# Patient Record
Sex: FEMALE | Race: BLACK OR AFRICAN AMERICAN | NOT HISPANIC OR LATINO | URBAN - METROPOLITAN AREA
[De-identification: names, ages, dates, MRNs, and addresses within clinical notes are randomized per-mention and may not be internally consistent; named-entity substitution may affect disease eponyms.]

---

## 2024-07-16 DIAGNOSIS — I70.222 CRITICAL LIMB ISCHEMIA OF LEFT LOWER EXTREMITY (MULTI): Primary | ICD-10-CM

## 2024-07-16 DIAGNOSIS — L97.509 TOE ULCER (MULTI): ICD-10-CM

## 2024-08-02 ENCOUNTER — HOSPITAL ENCOUNTER (OUTPATIENT)
Dept: VASCULAR MEDICINE | Facility: HOSPITAL | Age: 74
Discharge: HOME | End: 2024-08-02
Payer: COMMERCIAL

## 2024-08-02 DIAGNOSIS — L97.509 TOE ULCER (MULTI): ICD-10-CM

## 2024-08-02 DIAGNOSIS — I73.9 PERIPHERAL VASCULAR DISEASE, UNSPECIFIED (CMS-HCC): ICD-10-CM

## 2024-08-02 DIAGNOSIS — I70.222 CRITICAL LIMB ISCHEMIA OF LEFT LOWER EXTREMITY (MULTI): ICD-10-CM

## 2024-08-02 PROCEDURE — 93922 UPR/L XTREMITY ART 2 LEVELS: CPT

## 2024-08-02 PROCEDURE — 93922 UPR/L XTREMITY ART 2 LEVELS: CPT | Performed by: INTERNAL MEDICINE

## 2024-08-06 ENCOUNTER — APPOINTMENT (OUTPATIENT)
Dept: CARDIOLOGY | Facility: CLINIC | Age: 74
End: 2024-08-06

## 2024-08-06 VITALS
DIASTOLIC BLOOD PRESSURE: 79 MMHG | HEART RATE: 72 BPM | WEIGHT: 173.94 LBS | HEIGHT: 64 IN | SYSTOLIC BLOOD PRESSURE: 210 MMHG | BODY MASS INDEX: 29.7 KG/M2

## 2024-08-06 DIAGNOSIS — I73.9 PAD (PERIPHERAL ARTERY DISEASE) (CMS-HCC): Primary | ICD-10-CM

## 2024-08-06 DIAGNOSIS — N18.6 ESRD (END STAGE RENAL DISEASE) (MULTI): ICD-10-CM

## 2024-08-06 PROCEDURE — 99204 OFFICE O/P NEW MOD 45 MIN: CPT | Performed by: INTERNAL MEDICINE

## 2024-08-06 PROCEDURE — 3008F BODY MASS INDEX DOCD: CPT | Performed by: INTERNAL MEDICINE

## 2024-08-06 PROCEDURE — 1126F AMNT PAIN NOTED NONE PRSNT: CPT | Performed by: INTERNAL MEDICINE

## 2024-08-06 RX ORDER — APIXABAN 2.5 MG/1
2.5 TABLET, FILM COATED ORAL 2 TIMES DAILY
COMMUNITY

## 2024-08-06 RX ORDER — ASPIRIN 81 MG/1
81 TABLET ORAL DAILY
Qty: 30 TABLET | Refills: 11 | Status: SHIPPED | OUTPATIENT
Start: 2024-08-06 | End: 2025-08-06

## 2024-08-06 RX ORDER — AMLODIPINE BESYLATE 10 MG/1
1 TABLET ORAL
COMMUNITY
Start: 2024-01-07

## 2024-08-06 RX ORDER — ALBUTEROL SULFATE 0.83 MG/ML
2.5 SOLUTION RESPIRATORY (INHALATION) 3 TIMES DAILY
COMMUNITY

## 2024-08-06 RX ORDER — APIXABAN 5 MG/1
1 TABLET, FILM COATED ORAL
COMMUNITY
Start: 2023-11-27

## 2024-08-06 ASSESSMENT — PAIN SCALES - GENERAL: PAINLEVEL: 0-NO PAIN

## 2024-08-06 NOTE — PATIENT INSTRUCTIONS
It was a pleasure taking care of you today and appreciate your seeing us at our [facility].     Today's plan is as follows:  Will plan for right leg angiogram on August 28th, 2024 to fix blood flow to right leg. Instructions are below  Start taking baby aspirin every day  STOP taking Eliquis 48 hours prior to the procedure (STOP DATE August 26th, 2024)    INSTRUCTIONS FOR ANGIOGRAM  We are going to schedule you for an angioplasty procedure to open the blood flow to your right leg at [facility] ([address] [facility], [address]) with Dr. Craig  The cath lab staff will call you the day before the procedure for further instructions and time of the procedure.   The phone number to reach them at is [phone] (M-F, 6:30 am -5 pm)   You will need to have blood work done prior to the procedure. Please have blood drawn at any  location today or within a week of the procedure.  Plan to stay overnight after the procedure to be discharged the next day. But there is a chance you will be discharged home the same day.   You will need to have a ride to and from the hospital.   Please bring an updated list of all your medications or all the medication on the day of the procedure for review.  Please do not eat or drink anything after midnight before the procedure.   Please HOLD METFORMIN 48 hrs before the procedure.   If you take Insulin at night, TAKE HALF a dose of INSULIN the night before procedure and NO INSULIN in the morning of the procedure.   You can take the rest of your medications as prescribed in the morning of the procedure  with a sip of water.   Please DO NOT STOP taking ASPIRIN     Please call the office with any questions at [phone], press option 1  If you need coordinating your appointments and testing you can do these at the  or by calling my office shortly after your visit.

## 2024-08-06 NOTE — PROGRESS NOTES
[facility] Note    Referring Provider: Luis Motley DO  PCP: Pablo Sanders MD  Podiatrist: Rosita Montague DPM    CC: PAD with tissue loss  Subjective   HPI:  Vanessa Chowdhury is 73 y.o. female with history of HTN, HLD, Afib (on Eliquis), DM, ESRD on HD TTS (via left arm AVF), PAD who was referred by Dr. Motley for endovascular intervention for right foot tissue loss. Right toe ulcer for 2 months, Managed by Dr. Montague. Endorses right foot rest pain, that is relieved with dangling the leg. Non-ambulatory since 2012 after her cervical spine intervention. Gets around in a motorized wheelchair. However, she is able to stand and pivot. Takes a few steps with a walker.     He underwent bilateral lower extremity angiogram on 7/5/24 with Dr. Motley that demonstrated severe stenosis of bilateral SFA/pop with 1 vessel run-off (R AT and L PT).     Past Vascular History:  7/5/2024: B/L lower extremity angiogram (Dr. Motley)    Past Vascular Testing:  VARGAS/TBI (8/2/24): Right 0.48/0.40 and Left 0.55/0.68    PMH/PSH: HTN, HLD, Afib, DM, ESRD on HD (via left arm AVF), PAD, cervical laminectomy, cholecystectomy, parathyroidectomy with autotransplantation    Home Meds:  Current Outpatient Medications on File Prior to Visit   Medication Sig Dispense Refill    amLODIPine (Norvasc) 10 mg tablet Take 1 tablet (10 mg) by mouth early in the morning..      Eliquis 5 mg tablet Take 1 tablet (5 mg) by mouth every 12 hours.      albuterol 2.5 mg /3 mL (0.083 %) nebulizer solution Inhale 3 mL (2.5 mg) 3 times a day.      Eliquis 2.5 mg tablet Take 1 tablet (2.5 mg) by mouth 2 times a day.       No current facility-administered medications on file prior to visit.      Allergies:  Not on File  SH/FH: non-smoker, lives alone, grandkids come to provide some assistance     ROS: 12 system negative except HPI    Objective   Vitals:  Vitals:    08/06/24 1433   BP: (!) 210/79   Pulse: 72      Exam:  Constitutional: No acute distress, sitting in wheelchair  Neuro:  AOx3, grossly intact  CV: no tachycardia  Pulm: non-labored on room air  GI: soft, non-tender, non-distended  Extremities: sensory and motor intact, no wounds  Pulses: monophasic right PT, monophasic left PT, small right 2nd toe ulcer    Assessment/Plan   Vanessa Chowdhury is 73 y.o. female with HTN, HLD, Afib (on Eliquis), DM, ESRD on HD TTS (via left arm AVF), PAD with right 2nd toe ulcer.  VARGAS/TBI (8/2/24): Right 0.48/0.40 and Left 0.55/0.68  Diagnostic angiogram by Dr. Motley (7/2024) demonstrated diffuse right SFA/pop disease with occlusion of BK pop with peroneal recon and distal PT recon via peroneal collateral  Patient is allergic to statins and Zetia    Plan:  - will plan for right leg angiogram and intervention on August 28th, 2024  - start aspirin 81mg daily  - continue Eliquis until 48 hours to procedure  - post-procedure will plan to keep on plavix and Eliquis  - continue wound care per podiatry    Patient seen and discussed with attending, Dr. Rafa Craig DO

## 2024-08-06 NOTE — H&P (VIEW-ONLY)
[facility] Note    Referring Provider: Luis Motley DO  PCP: Pablo Sandres MD  Podiatrist: Rosita Montague DPM    CC: PAD with tissue loss  Subjective   HPI:  Vanessa Chowdhury is 73 y.o. female with history of HTN, HLD, Afib (on Eliquis), DM, ESRD on HD TTS (via left arm AVF), PAD who was referred by Dr. Motley for endovascular intervention for right foot tissue loss. Right toe ulcer for 2 months, Managed by Dr. Montague. Endorses right foot rest pain, that is relieved with dangling the leg. Non-ambulatory since 2012 after her cervical spine intervention. Gets around in a motorized wheelchair. However, she is able to stand and pivot. Takes a few steps with a walker.     He underwent bilateral lower extremity angiogram on 7/5/24 with Dr. Motley that demonstrated severe stenosis of bilateral SFA/pop with 1 vessel run-off (R AT and L PT).     Past Vascular History:  7/5/2024: B/L lower extremity angiogram (Dr. Motley)    Past Vascular Testing:  VARGAS/TBI (8/2/24): Right 0.48/0.40 and Left 0.55/0.68    PMH/PSH: HTN, HLD, Afib, DM, ESRD on HD (via left arm AVF), PAD, cervical laminectomy, cholecystectomy, parathyroidectomy with autotransplantation    Home Meds:  Current Outpatient Medications on File Prior to Visit   Medication Sig Dispense Refill    amLODIPine (Norvasc) 10 mg tablet Take 1 tablet (10 mg) by mouth early in the morning..      Eliquis 5 mg tablet Take 1 tablet (5 mg) by mouth every 12 hours.      albuterol 2.5 mg /3 mL (0.083 %) nebulizer solution Inhale 3 mL (2.5 mg) 3 times a day.      Eliquis 2.5 mg tablet Take 1 tablet (2.5 mg) by mouth 2 times a day.       No current facility-administered medications on file prior to visit.      Allergies:  Not on File  SH/FH: non-smoker, lives alone, grandkids come to provide some assistance     ROS: 12 system negative except HPI    Objective   Vitals:  Vitals:    08/06/24 1433   BP: (!) 210/79   Pulse: 72      Exam:  Constitutional: No acute distress, sitting in wheelchair  Neuro:  AOx3, grossly intact  CV: no tachycardia  Pulm: non-labored on room air  GI: soft, non-tender, non-distended  Extremities: sensory and motor intact, no wounds  Pulses: monophasic right PT, monophasic left PT, small right 2nd toe ulcer    Assessment/Plan   Vanessa Chowdhury is 73 y.o. female with HTN, HLD, Afib (on Eliquis), DM, ESRD on HD TTS (via left arm AVF), PAD with right 2nd toe ulcer.  VARGAS/TBI (8/2/24): Right 0.48/0.40 and Left 0.55/0.68  Diagnostic angiogram by Dr. Motley (7/2024) demonstrated diffuse right SFA/pop disease with occlusion of BK pop with peroneal recon and distal PT recon via peroneal collateral  Patient is allergic to statins and Zetia    Plan:  - will plan for right leg angiogram and intervention on August 28th, 2024  - start aspirin 81mg daily  - continue Eliquis until 48 hours to procedure  - post-procedure will plan to keep on plavix and Eliquis  - continue wound care per podiatry    Patient seen and discussed with attending, Dr. Rafa Craig DO

## 2024-08-19 ENCOUNTER — LAB (OUTPATIENT)
Dept: LAB | Facility: LAB | Age: 74
End: 2024-08-19
Payer: COMMERCIAL

## 2024-08-19 DIAGNOSIS — I73.9 PAD (PERIPHERAL ARTERY DISEASE) (CMS-HCC): ICD-10-CM

## 2024-08-19 DIAGNOSIS — N18.6 ESRD (END STAGE RENAL DISEASE) (MULTI): ICD-10-CM

## 2024-08-19 LAB
ALBUMIN SERPL BCP-MCNC: 3.5 G/DL (ref 3.4–5)
ANION GAP SERPL CALC-SCNC: 18 MMOL/L (ref 10–20)
BUN SERPL-MCNC: 59 MG/DL (ref 6–23)
CALCIUM SERPL-MCNC: 9.9 MG/DL (ref 8.6–10.3)
CHLORIDE SERPL-SCNC: 95 MMOL/L (ref 98–107)
CO2 SERPL-SCNC: 27 MMOL/L (ref 21–32)
CREAT SERPL-MCNC: 8.67 MG/DL (ref 0.5–1.05)
EGFRCR SERPLBLD CKD-EPI 2021: 4 ML/MIN/1.73M*2
ERYTHROCYTE [DISTWIDTH] IN BLOOD BY AUTOMATED COUNT: 13.7 % (ref 11.5–14.5)
GLUCOSE SERPL-MCNC: 251 MG/DL (ref 74–99)
HCT VFR BLD AUTO: 33.7 % (ref 36–46)
HGB BLD-MCNC: 11.2 G/DL (ref 12–16)
INR PPP: 1 (ref 0.9–1.1)
MCH RBC QN AUTO: 31.7 PG (ref 26–34)
MCHC RBC AUTO-ENTMCNC: 33.2 G/DL (ref 32–36)
MCV RBC AUTO: 96 FL (ref 80–100)
NRBC BLD-RTO: 0 /100 WBCS (ref 0–0)
PHOSPHATE SERPL-MCNC: 6.9 MG/DL (ref 2.5–4.9)
PLATELET # BLD AUTO: 195 X10*3/UL (ref 150–450)
POTASSIUM SERPL-SCNC: 4.1 MMOL/L (ref 3.5–5.3)
PROTHROMBIN TIME: 11.5 SECONDS (ref 9.8–12.8)
RBC # BLD AUTO: 3.53 X10*6/UL (ref 4–5.2)
SODIUM SERPL-SCNC: 136 MMOL/L (ref 136–145)
WBC # BLD AUTO: 4.7 X10*3/UL (ref 4.4–11.3)

## 2024-08-19 PROCEDURE — 85610 PROTHROMBIN TIME: CPT

## 2024-08-19 PROCEDURE — 36415 COLL VENOUS BLD VENIPUNCTURE: CPT

## 2024-08-19 PROCEDURE — 80069 RENAL FUNCTION PANEL: CPT

## 2024-08-19 PROCEDURE — 85027 COMPLETE CBC AUTOMATED: CPT

## 2024-08-28 ENCOUNTER — HOSPITAL ENCOUNTER (OUTPATIENT)
Facility: HOSPITAL | Age: 74
Discharge: HOME | End: 2024-08-29
Attending: INTERNAL MEDICINE | Admitting: INTERNAL MEDICINE
Payer: COMMERCIAL

## 2024-08-28 DIAGNOSIS — I73.9 PAD (PERIPHERAL ARTERY DISEASE) (CMS-HCC): ICD-10-CM

## 2024-08-28 DIAGNOSIS — I70.221 CRITICAL LIMB ISCHEMIA OF RIGHT LOWER EXTREMITY (MULTI): ICD-10-CM

## 2024-08-28 DIAGNOSIS — L97.511 SKIN ULCER OF TOE OF RIGHT FOOT, LIMITED TO BREAKDOWN OF SKIN (MULTI): ICD-10-CM

## 2024-08-28 DIAGNOSIS — I70.235 ATHEROSCLEROSIS OF NATIVE ARTERIES OF RIGHT LEG WITH ULCERATION OF OTHER PART OF FOOT (MULTI): ICD-10-CM

## 2024-08-28 DIAGNOSIS — I70.222 CRITICAL LIMB ISCHEMIA OF LEFT LOWER EXTREMITY (MULTI): Primary | ICD-10-CM

## 2024-08-28 LAB
ACT BLD: 141 SEC (ref 83–199)
ACT BLD: 390 SEC (ref 83–199)
ACT BLD: 392 SEC (ref 83–199)
ALBUMIN SERPL BCP-MCNC: 3.1 G/DL (ref 3.4–5)
ANION GAP SERPL CALC-SCNC: 14 MMOL/L (ref 10–20)
BUN SERPL-MCNC: 42 MG/DL (ref 6–23)
CALCIUM SERPL-MCNC: 8.8 MG/DL (ref 8.6–10.6)
CHLORIDE SERPL-SCNC: 95 MMOL/L (ref 98–107)
CO2 SERPL-SCNC: 32 MMOL/L (ref 21–32)
CREAT SERPL-MCNC: 6.05 MG/DL (ref 0.5–1.05)
EGFRCR SERPLBLD CKD-EPI 2021: 7 ML/MIN/1.73M*2
ERYTHROCYTE [DISTWIDTH] IN BLOOD BY AUTOMATED COUNT: 13.2 % (ref 11.5–14.5)
GLUCOSE BLD MANUAL STRIP-MCNC: 216 MG/DL (ref 74–99)
GLUCOSE BLD MANUAL STRIP-MCNC: 310 MG/DL (ref 74–99)
GLUCOSE SERPL-MCNC: 231 MG/DL (ref 74–99)
HCT VFR BLD AUTO: 31.4 % (ref 36–46)
HGB BLD-MCNC: 10.6 G/DL (ref 12–16)
MCH RBC QN AUTO: 31.4 PG (ref 26–34)
MCHC RBC AUTO-ENTMCNC: 33.8 G/DL (ref 32–36)
MCV RBC AUTO: 93 FL (ref 80–100)
NRBC BLD-RTO: 0 /100 WBCS (ref 0–0)
PHOSPHATE SERPL-MCNC: 4.8 MG/DL (ref 2.5–4.9)
PLATELET # BLD AUTO: 163 X10*3/UL (ref 150–450)
POTASSIUM SERPL-SCNC: 3.6 MMOL/L (ref 3.5–5.3)
RBC # BLD AUTO: 3.38 X10*6/UL (ref 4–5.2)
SODIUM SERPL-SCNC: 137 MMOL/L (ref 136–145)
WBC # BLD AUTO: 3.4 X10*3/UL (ref 4.4–11.3)

## 2024-08-28 PROCEDURE — 99152 MOD SED SAME PHYS/QHP 5/>YRS: CPT | Performed by: INTERNAL MEDICINE

## 2024-08-28 PROCEDURE — 85347 COAGULATION TIME ACTIVATED: CPT

## 2024-08-28 PROCEDURE — 2500000001 HC RX 250 WO HCPCS SELF ADMINISTERED DRUGS (ALT 637 FOR MEDICARE OP): Mod: SE

## 2024-08-28 PROCEDURE — C1725 CATH, TRANSLUMIN NON-LASER: HCPCS | Performed by: INTERNAL MEDICINE

## 2024-08-28 PROCEDURE — 37224 HC REVASCULARIZE FEM/POP ARTERY,ANGIOPLASTY: CPT | Performed by: INTERNAL MEDICINE

## 2024-08-28 PROCEDURE — 36415 COLL VENOUS BLD VENIPUNCTURE: CPT

## 2024-08-28 PROCEDURE — C2623 CATH, TRANSLUMIN, DRUG-COAT: HCPCS | Performed by: INTERNAL MEDICINE

## 2024-08-28 PROCEDURE — 2500000002 HC RX 250 W HCPCS SELF ADMINISTERED DRUGS (ALT 637 FOR MEDICARE OP, ALT 636 FOR OP/ED): Mod: SE

## 2024-08-28 PROCEDURE — 7100000011 HC EXTENDED STAY RECOVERY HOURLY - NURSING UNIT

## 2024-08-28 PROCEDURE — 7100000010 HC PHASE TWO TIME - EACH INCREMENTAL 1 MINUTE: Performed by: INTERNAL MEDICINE

## 2024-08-28 PROCEDURE — 75710 ARTERY X-RAYS ARM/LEG: CPT | Mod: 59 | Performed by: INTERNAL MEDICINE

## 2024-08-28 PROCEDURE — 2500000001 HC RX 250 WO HCPCS SELF ADMINISTERED DRUGS (ALT 637 FOR MEDICARE OP): Mod: SE | Performed by: INTERNAL MEDICINE

## 2024-08-28 PROCEDURE — 99153 MOD SED SAME PHYS/QHP EA: CPT | Performed by: INTERNAL MEDICINE

## 2024-08-28 PROCEDURE — 85347 COAGULATION TIME ACTIVATED: CPT | Performed by: INTERNAL MEDICINE

## 2024-08-28 PROCEDURE — 82947 ASSAY GLUCOSE BLOOD QUANT: CPT

## 2024-08-28 PROCEDURE — C1766 INTRO/SHEATH,STRBLE,NON-PEEL: HCPCS | Performed by: INTERNAL MEDICINE

## 2024-08-28 PROCEDURE — 2500000005 HC RX 250 GENERAL PHARMACY W/O HCPCS: Mod: SE | Performed by: INTERNAL MEDICINE

## 2024-08-28 PROCEDURE — 85027 COMPLETE CBC AUTOMATED: CPT

## 2024-08-28 PROCEDURE — 75710 ARTERY X-RAYS ARM/LEG: CPT | Performed by: INTERNAL MEDICINE

## 2024-08-28 PROCEDURE — 37228 PR REVSC OPN/PRQ TIB/PERO W/ANGIOPLASTY UNI: CPT | Performed by: INTERNAL MEDICINE

## 2024-08-28 PROCEDURE — 7100000009 HC PHASE TWO TIME - INITIAL BASE CHARGE: Performed by: INTERNAL MEDICINE

## 2024-08-28 PROCEDURE — 37224 PR REVSC OPN/PRG FEM/POP W/ANGIOPLASTY UNI: CPT | Performed by: INTERNAL MEDICINE

## 2024-08-28 PROCEDURE — 2550000001 HC RX 255 CONTRASTS: Mod: SE | Performed by: INTERNAL MEDICINE

## 2024-08-28 PROCEDURE — 2500000004 HC RX 250 GENERAL PHARMACY W/ HCPCS (ALT 636 FOR OP/ED): Mod: SE | Performed by: INTERNAL MEDICINE

## 2024-08-28 PROCEDURE — 2720000007 HC OR 272 NO HCPCS: Performed by: INTERNAL MEDICINE

## 2024-08-28 PROCEDURE — 76937 US GUIDE VASCULAR ACCESS: CPT | Performed by: INTERNAL MEDICINE

## 2024-08-28 PROCEDURE — 84100 ASSAY OF PHOSPHORUS: CPT

## 2024-08-28 PROCEDURE — C1769 GUIDE WIRE: HCPCS | Performed by: INTERNAL MEDICINE

## 2024-08-28 PROCEDURE — C1894 INTRO/SHEATH, NON-LASER: HCPCS | Performed by: INTERNAL MEDICINE

## 2024-08-28 PROCEDURE — 37228 HC REVASCULARIZE TIBIAL/PERON ARTERY,ANGIOPLASTY INITIAL: CPT | Performed by: INTERNAL MEDICINE

## 2024-08-28 PROCEDURE — 96373 THER/PROPH/DIAG INJ IA: CPT | Mod: 59 | Performed by: INTERNAL MEDICINE

## 2024-08-28 RX ORDER — PARICALCITOL 5 UG/ML
4 INJECTION, SOLUTION INTRAVENOUS 3 TIMES WEEKLY
Status: DISCONTINUED | OUTPATIENT
Start: 2024-08-29 | End: 2024-08-29 | Stop reason: HOSPADM

## 2024-08-28 RX ORDER — ACETAMINOPHEN 325 MG/1
650 TABLET ORAL EVERY 6 HOURS PRN
Status: DISCONTINUED | OUTPATIENT
Start: 2024-08-28 | End: 2024-08-29 | Stop reason: HOSPADM

## 2024-08-28 RX ORDER — ASPIRIN 325 MG
TABLET ORAL AS NEEDED
Status: DISCONTINUED | OUTPATIENT
Start: 2024-08-28 | End: 2024-08-28 | Stop reason: HOSPADM

## 2024-08-28 RX ORDER — LIDOCAINE HYDROCHLORIDE 10 MG/ML
INJECTION, SOLUTION EPIDURAL; INFILTRATION; INTRACAUDAL; PERINEURAL AS NEEDED
Status: DISCONTINUED | OUTPATIENT
Start: 2024-08-28 | End: 2024-08-28 | Stop reason: HOSPADM

## 2024-08-28 RX ORDER — MIDAZOLAM HYDROCHLORIDE 1 MG/ML
INJECTION, SOLUTION INTRAMUSCULAR; INTRAVENOUS AS NEEDED
Status: DISCONTINUED | OUTPATIENT
Start: 2024-08-28 | End: 2024-08-28 | Stop reason: HOSPADM

## 2024-08-28 RX ORDER — SODIUM CHLORIDE 9 MG/ML
1 INJECTION, SOLUTION INTRAVENOUS CONTINUOUS
Status: DISCONTINUED | OUTPATIENT
Start: 2024-08-28 | End: 2024-08-28

## 2024-08-28 RX ORDER — CARVEDILOL 25 MG/1
25 TABLET ORAL NIGHTLY
Status: DISCONTINUED | OUTPATIENT
Start: 2024-08-28 | End: 2024-08-29 | Stop reason: HOSPADM

## 2024-08-28 RX ORDER — DEXTROSE 50 % IN WATER (D50W) INTRAVENOUS SYRINGE
25
Status: DISCONTINUED | OUTPATIENT
Start: 2024-08-28 | End: 2024-08-29 | Stop reason: HOSPADM

## 2024-08-28 RX ORDER — CALCIUM CARBONATE 200(500)MG
1000 TABLET,CHEWABLE ORAL EVERY EVENING
Status: CANCELLED | OUTPATIENT
Start: 2024-08-28

## 2024-08-28 RX ORDER — CALCIUM ACETATE 667 MG/1
1 CAPSULE ORAL
COMMUNITY
Start: 2023-02-17

## 2024-08-28 RX ORDER — INSULIN DETEMIR 100 [IU]/ML
10 INJECTION, SOLUTION SUBCUTANEOUS NIGHTLY
COMMUNITY

## 2024-08-28 RX ORDER — HEPARIN SODIUM 1000 [USP'U]/ML
INJECTION, SOLUTION INTRAVENOUS; SUBCUTANEOUS AS NEEDED
Status: DISCONTINUED | OUTPATIENT
Start: 2024-08-28 | End: 2024-08-28 | Stop reason: HOSPADM

## 2024-08-28 RX ORDER — DOCUSATE SODIUM 100 MG/1
100 CAPSULE, LIQUID FILLED ORAL NIGHTLY PRN
COMMUNITY

## 2024-08-28 RX ORDER — IODIXANOL 320 MG/ML
INJECTION, SOLUTION INTRAVASCULAR AS NEEDED
Status: DISCONTINUED | OUTPATIENT
Start: 2024-08-28 | End: 2024-08-28 | Stop reason: HOSPADM

## 2024-08-28 RX ORDER — DOCUSATE SODIUM 100 MG/1
100 CAPSULE, LIQUID FILLED ORAL NIGHTLY PRN
Status: CANCELLED | OUTPATIENT
Start: 2024-08-28

## 2024-08-28 RX ORDER — PROTAMINE SULFATE 10 MG/ML
INJECTION, SOLUTION INTRAVENOUS CONTINUOUS PRN
Status: COMPLETED | OUTPATIENT
Start: 2024-08-28 | End: 2024-08-28

## 2024-08-28 RX ORDER — FENTANYL CITRATE 50 UG/ML
INJECTION, SOLUTION INTRAMUSCULAR; INTRAVENOUS AS NEEDED
Status: DISCONTINUED | OUTPATIENT
Start: 2024-08-28 | End: 2024-08-28 | Stop reason: HOSPADM

## 2024-08-28 RX ORDER — CARVEDILOL 25 MG/1
25 TABLET ORAL 2 TIMES DAILY
COMMUNITY

## 2024-08-28 RX ORDER — ALBUTEROL SULFATE 0.83 MG/ML
2.5 SOLUTION RESPIRATORY (INHALATION) EVERY 6 HOURS PRN
Status: DISCONTINUED | OUTPATIENT
Start: 2024-08-28 | End: 2024-08-29 | Stop reason: HOSPADM

## 2024-08-28 RX ORDER — CALCIUM ACETATE 667 MG/1
667 CAPSULE ORAL
Status: CANCELLED | OUTPATIENT
Start: 2024-08-29

## 2024-08-28 RX ORDER — CALCIUM CARBONATE 200(500)MG
1000 TABLET,CHEWABLE ORAL EVERY EVENING
COMMUNITY
Start: 2023-04-13

## 2024-08-28 RX ORDER — INSULIN LISPRO 100 [IU]/ML
0-5 INJECTION, SOLUTION INTRAVENOUS; SUBCUTANEOUS
Status: DISCONTINUED | OUTPATIENT
Start: 2024-08-28 | End: 2024-08-29 | Stop reason: HOSPADM

## 2024-08-28 RX ORDER — HYDRALAZINE HYDROCHLORIDE 20 MG/ML
10 INJECTION INTRAMUSCULAR; INTRAVENOUS ONCE
Status: COMPLETED | OUTPATIENT
Start: 2024-08-28 | End: 2024-08-28

## 2024-08-28 RX ORDER — ASPIRIN 81 MG/1
81 TABLET ORAL DAILY
Status: DISCONTINUED | OUTPATIENT
Start: 2024-08-29 | End: 2024-08-28

## 2024-08-28 RX ORDER — CLOPIDOGREL BISULFATE 300 MG/1
TABLET, FILM COATED ORAL AS NEEDED
Status: DISCONTINUED | OUTPATIENT
Start: 2024-08-28 | End: 2024-08-28 | Stop reason: HOSPADM

## 2024-08-28 RX ORDER — AMLODIPINE BESYLATE 10 MG/1
10 TABLET ORAL
Status: DISCONTINUED | OUTPATIENT
Start: 2024-08-28 | End: 2024-08-29 | Stop reason: HOSPADM

## 2024-08-28 RX ORDER — DEXTROSE 50 % IN WATER (D50W) INTRAVENOUS SYRINGE
12.5
Status: DISCONTINUED | OUTPATIENT
Start: 2024-08-28 | End: 2024-08-29 | Stop reason: HOSPADM

## 2024-08-28 SDOH — SOCIAL STABILITY: SOCIAL INSECURITY: DO YOU FEEL UNSAFE GOING BACK TO THE PLACE WHERE YOU ARE LIVING?: NO

## 2024-08-28 SDOH — SOCIAL STABILITY: SOCIAL INSECURITY: HAVE YOU HAD THOUGHTS OF HARMING ANYONE ELSE?: NO

## 2024-08-28 SDOH — SOCIAL STABILITY: SOCIAL INSECURITY: WERE YOU ABLE TO COMPLETE ALL THE BEHAVIORAL HEALTH SCREENINGS?: YES

## 2024-08-28 SDOH — SOCIAL STABILITY: SOCIAL INSECURITY: ARE THERE ANY APPARENT SIGNS OF INJURIES/BEHAVIORS THAT COULD BE RELATED TO ABUSE/NEGLECT?: NO

## 2024-08-28 SDOH — SOCIAL STABILITY: SOCIAL INSECURITY: DOES ANYONE TRY TO KEEP YOU FROM HAVING/CONTACTING OTHER FRIENDS OR DOING THINGS OUTSIDE YOUR HOME?: NO

## 2024-08-28 SDOH — SOCIAL STABILITY: SOCIAL INSECURITY: HAVE YOU HAD ANY THOUGHTS OF HARMING ANYONE ELSE?: NO

## 2024-08-28 SDOH — SOCIAL STABILITY: SOCIAL INSECURITY: HAS ANYONE EVER THREATENED TO HURT YOUR FAMILY OR YOUR PETS?: NO

## 2024-08-28 SDOH — SOCIAL STABILITY: SOCIAL INSECURITY: DO YOU FEEL ANYONE HAS EXPLOITED OR TAKEN ADVANTAGE OF YOU FINANCIALLY OR OF YOUR PERSONAL PROPERTY?: NO

## 2024-08-28 SDOH — SOCIAL STABILITY: SOCIAL INSECURITY: ARE YOU OR HAVE YOU BEEN THREATENED OR ABUSED PHYSICALLY, EMOTIONALLY, OR SEXUALLY BY ANYONE?: NO

## 2024-08-28 SDOH — SOCIAL STABILITY: SOCIAL INSECURITY: ABUSE: ADULT

## 2024-08-28 ASSESSMENT — COGNITIVE AND FUNCTIONAL STATUS - GENERAL
HELP NEEDED FOR BATHING: A LITTLE
DRESSING REGULAR UPPER BODY CLOTHING: A LITTLE
STANDING UP FROM CHAIR USING ARMS: A LOT
PERSONAL GROOMING: A LITTLE
DRESSING REGULAR LOWER BODY CLOTHING: A LITTLE
WALKING IN HOSPITAL ROOM: TOTAL
CLIMB 3 TO 5 STEPS WITH RAILING: TOTAL
CLIMB 3 TO 5 STEPS WITH RAILING: TOTAL
WALKING IN HOSPITAL ROOM: TOTAL
HELP NEEDED FOR BATHING: A LITTLE
DAILY ACTIVITIY SCORE: 18
DRESSING REGULAR LOWER BODY CLOTHING: A LITTLE
MOVING TO AND FROM BED TO CHAIR: A LITTLE
MOBILITY SCORE: 15
MOVING TO AND FROM BED TO CHAIR: A LOT
PATIENT BASELINE BEDBOUND: NO
TOILETING: A LOT
TURNING FROM BACK TO SIDE WHILE IN FLAT BAD: A LITTLE
STANDING UP FROM CHAIR USING ARMS: A LOT
MOBILITY SCORE: 13
PERSONAL GROOMING: A LITTLE
DRESSING REGULAR UPPER BODY CLOTHING: A LITTLE
TOILETING: A LOT
DAILY ACTIVITIY SCORE: 18

## 2024-08-28 ASSESSMENT — PAIN - FUNCTIONAL ASSESSMENT: PAIN_FUNCTIONAL_ASSESSMENT: 0-10

## 2024-08-28 ASSESSMENT — LIFESTYLE VARIABLES
PRESCIPTION_ABUSE_PAST_12_MONTHS: NO
AUDIT-C TOTAL SCORE: 0
HOW OFTEN DO YOU HAVE A DRINK CONTAINING ALCOHOL: NEVER
SKIP TO QUESTIONS 9-10: 1
HOW MANY STANDARD DRINKS CONTAINING ALCOHOL DO YOU HAVE ON A TYPICAL DAY: PATIENT DOES NOT DRINK
HOW OFTEN DO YOU HAVE 6 OR MORE DRINKS ON ONE OCCASION: NEVER
SUBSTANCE_ABUSE_PAST_12_MONTHS: NO
AUDIT-C TOTAL SCORE: 0

## 2024-08-28 ASSESSMENT — ACTIVITIES OF DAILY LIVING (ADL)
JUDGMENT_ADEQUATE_SAFELY_COMPLETE_DAILY_ACTIVITIES: YES
GROOMING: NEEDS ASSISTANCE
WALKS IN HOME: NEEDS ASSISTANCE
ADEQUATE_TO_COMPLETE_ADL: YES
DRESSING YOURSELF: NEEDS ASSISTANCE
HEARING - LEFT EAR: FUNCTIONAL
HEARING - RIGHT EAR: FUNCTIONAL
ASSISTIVE_DEVICE: OTHER (COMMENT)
BATHING: NEEDS ASSISTANCE
FEEDING YOURSELF: INDEPENDENT
TOILETING: NEEDS ASSISTANCE
PATIENT'S MEMORY ADEQUATE TO SAFELY COMPLETE DAILY ACTIVITIES?: YES

## 2024-08-28 ASSESSMENT — PATIENT HEALTH QUESTIONNAIRE - PHQ9
SUM OF ALL RESPONSES TO PHQ9 QUESTIONS 1 & 2: 0
2. FEELING DOWN, DEPRESSED OR HOPELESS: NOT AT ALL
1. LITTLE INTEREST OR PLEASURE IN DOING THINGS: NOT AT ALL

## 2024-08-28 ASSESSMENT — PAIN SCALES - GENERAL
PAINLEVEL_OUTOF10: 0 - NO PAIN
PAINLEVEL_OUTOF10: 0 - NO PAIN

## 2024-08-28 ASSESSMENT — COLUMBIA-SUICIDE SEVERITY RATING SCALE - C-SSRS
6. HAVE YOU EVER DONE ANYTHING, STARTED TO DO ANYTHING, OR PREPARED TO DO ANYTHING TO END YOUR LIFE?: NO
1. IN THE PAST MONTH, HAVE YOU WISHED YOU WERE DEAD OR WISHED YOU COULD GO TO SLEEP AND NOT WAKE UP?: NO
2. HAVE YOU ACTUALLY HAD ANY THOUGHTS OF KILLING YOURSELF?: NO

## 2024-08-28 NOTE — PROGRESS NOTES
"Pharmacy Medication History Review    Vanessa Chowdhury is a 74 y.o. female admitted for PAD (peripheral artery disease) (CMS-HCC). Pharmacy reviewed the patient's [id number] medications and allergies for accuracy.    The list below reflects the updated PTA list.   Prior to Admission Medications   Prescriptions Last Dose Informant   Docusate sodium (Colace) 100 mg capsule Past Week Self, Other   Sig: Take 1 capsule (100 mg) by mouth as needed at bedtime for constipation     Eliquis 2.5 mg tablet Past Week Self, Other   Sig: Take 1 tablet (2.5 mg) by mouth 2 times a day.  Only takes once daily at bedtime   Levemir FlexPen 100 unit/mL (3 mL) pen  Self, Other   Sig: Inject 10 Units under the skin once daily at bedtime.   albuterol 2.5 mg /3 mL (0.083 %) nebulizer solution Not Taking Self, Other   Sig: Inhale 3 mL (2.5 mg) 3 times a day.   amLODIPine (Norvasc) 10 mg tablet 8/27/2024 at 2100 Self, Other   Sig: Take 1 tablet (10 mg) by mouth once daily in the evening.   aspirin 81 mg EC tablet 8/27/2024 at 2100 Self, Other   Sig: Take 1 tablet (81 mg) by mouth once daily.   calcium acetate (Phoslo) 667 mg capsule  Self, Other   Sig: Take 1 capsule (667 mg) by mouth 3 times daily (morning, midday, late afternoon).  Using very infrequently, only when she \"eats something bad\"   calcium carbonate (Tums) 200 mg calcium chewable tablet  Self, Other   Sig: Chew 2 tablets (1,000 mg) once daily in the evening.   carvedilol (Coreg) 25 mg tablet 8/27/2024 at 2100 Self, Other   Sig: Take 1 tablet (25 mg) by mouth 2 times a day.  Only taking once daily at bedtime      Facility-Administered Medications: None        The list below reflects the updated allergy list. Please review each documented allergy for additional clarification and justification.  Allergies  Reviewed by Eden Espinoza, PharmD on 8/28/2024        Severity Reactions Comments    Atorvastatin Not Specified Myalgia     Clonidine Not Specified Photosensitivity     " Hydrochlorothiazide Not Specified Photosensitivity     Zetia [ezetimibe] Not Specified Myalgia             Patient declines M2B at discharge. Pharmacy has been updated to [facility].    Sources used to complete the med history include: Patient interview - moderate historian, reports only taking her medications at bedtime/ Pharmacy - [facility]/ Chart review - [facility] summary, Cardiology visit 8/6/24    Below are additional concerns with the patient's PTA list.  Patient initially reported only taking 3 medications at home (amlodipine, eliquis, and carvedilol) that she takes once daily at bedtime. Upon further questions, also revealed she uses aspirin and levemir more regularly; calcium acetate and tums infrequently.      Medication on list from [facility] that patient is NOT TAKING (no recent dispense history):  Calcitriol 0.25mcg once daily  Clopidogrel 75mg daily  Pantoprazole 40mg daily  Tizanidine 2mg BID PRN  Hydralazine 25mg BID with food  Vitamin d2 50,000units once monthly  Albuterol HFA 2 puffs Q4H PRN SOB/wheezing    Medications ADDED:  Calcium acetate  Tums  Levemir   Docusate   Medications CHANGED:  Carvedilol   Medications REMOVED:   None     Eden Espinoza, Luda  Transitions of Care Pharmacist  [facility]  Please reach out via Secure Chat for questions, or if no response call [facility] or [facility]

## 2024-08-28 NOTE — Clinical Note
Patient Clipped and Prepped: right pedal. Prepped with Betadine and draped in sterile fashion. Foot/ankle lateral side

## 2024-08-28 NOTE — POST-PROCEDURE NOTE
Physician Transition of Care Summary  Invasive Cardiovascular Lab    Procedure Date: 8/28/2024  Attending:    * Esvin Craig - Primary  Resident/Fellow/Other Assistant: Surgeons and Role:     * Brittaney Musa MD - Fellow     * Jhonny Dangelo MD - Fellow    Indications:   Pre-op Diagnosis      * PAD (peripheral artery disease) (CMS-[facility]) [I73.9]    Post-procedure diagnosis:   Post-op Diagnosis     * PAD (peripheral artery disease) (CMS-HCC) [I73.9]    Procedure(s):   Lower Extremity Angiogram with Intervention  69248 - CHG ANGIOGRAPHY EXTREMITY UNILATERAL RS&I        Procedure Findings:   Severe disease of distal SFA/popliteal/occlusion of PT/AT/Peroneal and reconstitution of proximal peroneal S/p right sfa , pop and peroneal pta. Dcb to sfa and pop. Antegrade sheath 6.5. Has to stay overnight discharge tomorrow     Access/Closure:  Right common femoral artery- antegrade/Sheath in place for manual pull      Complications:   None    Stents/Implants:   Implants       No implant documentation for this case.            Anticoagulation/Antiplatelet Plan:   Aspirin/Plavix    Estimated Blood Loss:   2 mL    Anesthesia: Moderate Sedation Anesthesia Staff: No anesthesia staff entered.    Any Specimen(s) Removed:   No specimens collected during this procedure.    Disposition:   inpatient      Electronically signed by: Brittaney Musa MD, 8/28/2024 3:19 PM

## 2024-08-28 NOTE — CARE PLAN
Problem: Skin  Goal: Decreased wound size/increased tissue granulation at next dressing change  Outcome: Progressing  Goal: Participates in plan/prevention/treatment measures  Outcome: Progressing  Goal: Prevent/manage excess moisture  Outcome: Progressing  Goal: Prevent/minimize sheer/friction injuries  Outcome: Progressing  Goal: Promote/optimize nutrition  Outcome: Progressing  Goal: Promote skin healing  Outcome: Progressing     Problem: Diabetes  Goal: I will have no complications due to diabetes  Outcome: Progressing     Problem: Pain  Goal: Takes deep breaths with improved pain control throughout the shift  Outcome: Progressing  Goal: Turns in bed with improved pain control throughout the shift  Outcome: Progressing  Goal: Walks with improved pain control throughout the shift  Outcome: Progressing  Goal: Performs ADL's with improved pain control throughout shift  Outcome: Progressing  Goal: Participates in PT with improved pain control throughout the shift  Outcome: Progressing  Goal: Free from opioid side effects throughout the shift  Outcome: Progressing  Goal: Free from acute confusion related to pain meds throughout the shift  Outcome: Progressing     Problem: Respiratory  Goal: Clear secretions with interventions this shift  Outcome: Progressing  Goal: Minimize anxiety/maximize coping throughout shift  Outcome: Progressing  Goal: Minimal/no exertional discomfort or dyspnea this shift  Outcome: Progressing  Goal: No signs of respiratory distress (eg. Use of accessory muscles. Peds grunting)  Outcome: Progressing  Goal: Patent airway maintained this shift  Outcome: Progressing  Goal: Tolerate mechanical ventilation evidenced by VS/agitation level this shift  Outcome: Progressing  Goal: Tolerate pulmonary toileting this shift  Outcome: Progressing  Goal: Verbalize decreased shortness of breath this shift  Outcome: Progressing  Goal: Wean oxygen to maintain O2 saturation per order/standard this  shift  Outcome: Progressing  Goal: Increase self care and/or family involvement in next 24 hours  Outcome: Progressing   The patient's goals for the shift include      The clinical goals for the shift include

## 2024-08-29 ENCOUNTER — APPOINTMENT (OUTPATIENT)
Dept: DIALYSIS | Facility: HOSPITAL | Age: 74
End: 2024-08-29
Payer: COMMERCIAL

## 2024-08-29 VITALS
HEIGHT: 64 IN | DIASTOLIC BLOOD PRESSURE: 57 MMHG | WEIGHT: 173 LBS | TEMPERATURE: 97.2 F | BODY MASS INDEX: 29.53 KG/M2 | HEART RATE: 70 BPM | SYSTOLIC BLOOD PRESSURE: 120 MMHG | RESPIRATION RATE: 18 BRPM | OXYGEN SATURATION: 100 %

## 2024-08-29 PROBLEM — I70.221 CRITICAL LIMB ISCHEMIA OF RIGHT LOWER EXTREMITY (MULTI): Status: RESOLVED | Noted: 2024-08-28 | Resolved: 2024-08-29

## 2024-08-29 LAB
ALBUMIN SERPL BCP-MCNC: 3.3 G/DL (ref 3.4–5)
ANION GAP SERPL CALC-SCNC: 17 MMOL/L (ref 10–20)
BUN SERPL-MCNC: 53 MG/DL (ref 6–23)
CALCIUM SERPL-MCNC: 8.8 MG/DL (ref 8.6–10.6)
CHLORIDE SERPL-SCNC: 95 MMOL/L (ref 98–107)
CO2 SERPL-SCNC: 27 MMOL/L (ref 21–32)
CREAT SERPL-MCNC: 6.83 MG/DL (ref 0.5–1.05)
EGFRCR SERPLBLD CKD-EPI 2021: 6 ML/MIN/1.73M*2
ERYTHROCYTE [DISTWIDTH] IN BLOOD BY AUTOMATED COUNT: 13.2 % (ref 11.5–14.5)
GLUCOSE BLD MANUAL STRIP-MCNC: 133 MG/DL (ref 74–99)
GLUCOSE BLD MANUAL STRIP-MCNC: 155 MG/DL (ref 74–99)
GLUCOSE BLD MANUAL STRIP-MCNC: 65 MG/DL (ref 74–99)
GLUCOSE SERPL-MCNC: 66 MG/DL (ref 74–99)
HBV SURFACE AB SER-ACNC: <3.1 MIU/ML
HBV SURFACE AG SERPL QL IA: NONREACTIVE
HCT VFR BLD AUTO: 28.4 % (ref 36–46)
HGB BLD-MCNC: 9.7 G/DL (ref 12–16)
MCH RBC QN AUTO: 30.6 PG (ref 26–34)
MCHC RBC AUTO-ENTMCNC: 34.2 G/DL (ref 32–36)
MCV RBC AUTO: 90 FL (ref 80–100)
NRBC BLD-RTO: 0 /100 WBCS (ref 0–0)
PHOSPHATE SERPL-MCNC: 4.8 MG/DL (ref 2.5–4.9)
PLATELET # BLD AUTO: 185 X10*3/UL (ref 150–450)
POTASSIUM SERPL-SCNC: 4 MMOL/L (ref 3.5–5.3)
RBC # BLD AUTO: 3.17 X10*6/UL (ref 4–5.2)
SODIUM SERPL-SCNC: 135 MMOL/L (ref 136–145)
WBC # BLD AUTO: 5.4 X10*3/UL (ref 4.4–11.3)

## 2024-08-29 PROCEDURE — 2500000001 HC RX 250 WO HCPCS SELF ADMINISTERED DRUGS (ALT 637 FOR MEDICARE OP): Mod: SE

## 2024-08-29 PROCEDURE — 85027 COMPLETE CBC AUTOMATED: CPT | Performed by: NURSE PRACTITIONER

## 2024-08-29 PROCEDURE — 84100 ASSAY OF PHOSPHORUS: CPT | Performed by: NURSE PRACTITIONER

## 2024-08-29 PROCEDURE — 7100000011 HC EXTENDED STAY RECOVERY HOURLY - NURSING UNIT

## 2024-08-29 PROCEDURE — 99221 1ST HOSP IP/OBS SF/LOW 40: CPT | Performed by: INTERNAL MEDICINE

## 2024-08-29 PROCEDURE — 90935 HEMODIALYSIS ONE EVALUATION: CPT | Performed by: INTERNAL MEDICINE

## 2024-08-29 PROCEDURE — 99239 HOSP IP/OBS DSCHRG MGMT >30: CPT

## 2024-08-29 PROCEDURE — 87340 HEPATITIS B SURFACE AG IA: CPT | Performed by: INTERNAL MEDICINE

## 2024-08-29 PROCEDURE — 8010000001 HC DIALYSIS - HEMODIALYSIS PER DAY

## 2024-08-29 PROCEDURE — 82947 ASSAY GLUCOSE BLOOD QUANT: CPT | Mod: 91

## 2024-08-29 PROCEDURE — 86706 HEP B SURFACE ANTIBODY: CPT | Performed by: INTERNAL MEDICINE

## 2024-08-29 PROCEDURE — 2500000004 HC RX 250 GENERAL PHARMACY W/ HCPCS (ALT 636 FOR OP/ED): Mod: SE | Performed by: INTERNAL MEDICINE

## 2024-08-29 PROCEDURE — 36415 COLL VENOUS BLD VENIPUNCTURE: CPT | Performed by: NURSE PRACTITIONER

## 2024-08-29 RX ORDER — CLOPIDOGREL BISULFATE 75 MG/1
75 TABLET ORAL DAILY
Qty: 90 TABLET | Refills: 3 | Status: SHIPPED | OUTPATIENT
Start: 2024-08-29 | End: 2025-08-29

## 2024-08-29 ASSESSMENT — COGNITIVE AND FUNCTIONAL STATUS - GENERAL
WALKING IN HOSPITAL ROOM: TOTAL
DRESSING REGULAR LOWER BODY CLOTHING: A LITTLE
STANDING UP FROM CHAIR USING ARMS: A LOT
MOBILITY SCORE: 15
MOVING TO AND FROM BED TO CHAIR: A LITTLE
CLIMB 3 TO 5 STEPS WITH RAILING: TOTAL
HELP NEEDED FOR BATHING: A LITTLE
DRESSING REGULAR UPPER BODY CLOTHING: A LITTLE
DAILY ACTIVITIY SCORE: 18
TOILETING: A LOT
PERSONAL GROOMING: A LITTLE

## 2024-08-29 ASSESSMENT — PAIN SCALES - GENERAL: PAINLEVEL_OUTOF10: 0 - NO PAIN

## 2024-08-29 ASSESSMENT — PAIN - FUNCTIONAL ASSESSMENT: PAIN_FUNCTIONAL_ASSESSMENT: NO/DENIES PAIN

## 2024-08-29 ASSESSMENT — ACTIVITIES OF DAILY LIVING (ADL)
LACK_OF_TRANSPORTATION: NO
LACK_OF_TRANSPORTATION: NO

## 2024-08-29 NOTE — CARE PLAN
The patient's goals for the shift include      The clinical goals for the shift include Paitent will remain HDS overnight with no s/s of bleeding from cath site.      Problem: Skin  Goal: Decreased wound size/increased tissue granulation at next dressing change  Outcome: Progressing  Goal: Participates in plan/prevention/treatment measures  Outcome: Progressing  Goal: Prevent/manage excess moisture  Outcome: Progressing  Goal: Prevent/minimize sheer/friction injuries  Outcome: Progressing  Goal: Promote/optimize nutrition  Outcome: Progressing  Goal: Promote skin healing  Outcome: Progressing     Problem: Diabetes  Goal: I will have no complications due to diabetes  Outcome: Progressing     Problem: Pain  Goal: Takes deep breaths with improved pain control throughout the shift  Outcome: Progressing  Goal: Turns in bed with improved pain control throughout the shift  Outcome: Progressing  Goal: Walks with improved pain control throughout the shift  Outcome: Progressing  Goal: Performs ADL's with improved pain control throughout shift  Outcome: Progressing  Goal: Participates in PT with improved pain control throughout the shift  Outcome: Progressing  Goal: Free from opioid side effects throughout the shift  Outcome: Progressing  Goal: Free from acute confusion related to pain meds throughout the shift  Outcome: Progressing     Problem: Respiratory  Goal: Clear secretions with interventions this shift  Outcome: Progressing  Goal: Minimize anxiety/maximize coping throughout shift  Outcome: Progressing  Goal: Minimal/no exertional discomfort or dyspnea this shift  Outcome: Progressing  Goal: No signs of respiratory distress (eg. Use of accessory muscles. Peds grunting)  Outcome: Progressing  Goal: Patent airway maintained this shift  Outcome: Progressing  Goal: Tolerate mechanical ventilation evidenced by VS/agitation level this shift  Outcome: Progressing  Goal: Tolerate pulmonary toileting this shift  Outcome:  Progressing  Goal: Verbalize decreased shortness of breath this shift  Outcome: Progressing  Goal: Wean oxygen to maintain O2 saturation per order/standard this shift  Outcome: Progressing  Goal: Increase self care and/or family involvement in next 24 hours  Outcome: Progressing

## 2024-08-29 NOTE — DISCHARGE SUMMARY
Discharge Diagnosis  PAD (peripheral artery disease) (CMS-[facility])    Issues Requiring Follow-Up  S/p R Lower Extremity Angiogram with Intervention, 1 month FU with VARGAS/TBI without Ex    Test Results Pending At Discharge  Pending Labs       No current pending labs.            Hospital Course   Vanessa Chowdhury is 73 y.o. female with history of HTN, HLD, Afib (on Eliquis), DM, ESRD on HD TTS (via left arm AVF), PAD who was referred by Dr. Motley for endovascular intervention for right foot tissue loss. Right toe ulcer for 2 months, Managed by Dr. Montague. Endorses right foot rest pain, that is relieved with dangling the leg. Non-ambulatory since 2012 after her cervical spine intervention. Gets around in a motorized wheelchair. However, she is able to stand and pivot. Takes a few steps with a walker.      She underwent bilateral lower extremity angiogram on 7/5/24 with Dr. Motley that demonstrated severe stenosis of bilateral SFA/pop with 1 vessel run-off (R AT and L PT).      Past Vascular History:  7/5/2024: B/L lower extremity angiogram (Dr. Motley)     Past Vascular Testing:  VARGAS/TBI (8/2/24): Right 0.48/0.40 and Left 0.55/0.68    8/28/24 R Lower Extremity Angiogram with Intervention  Severe disease of distal SFA/popliteal/occlusion of PT/AT/Peroneal and reconstitution of proximal peroneal S/p right sfa , pop and peroneal pta. Dcb to sfa and pop. Antegrade sheath 6.5.   Patient was admitted overnight for observation, underwent HD in the morning on the discharge day. Post procedure patient was started on Plavix 75 mg. We are stopping Aspirin, patient will continue taking her Eliquis 2.5 mg. Changes to the medication and plan were discussed with the patient at the bedside.   She reports feeling good with no complains of pain or any discomfort from the procedure. She tolerated her hemodialysis session well this morning. Vital signs had been stable.    Plan for clinic follow up in 1 month with Vascular testing VARGAS/TBI  without Ex reviewed with the patient and appointments adjusted based on patient's request.      Pertinent Physical Exam At Time of Discharge  Physical Exam  Constitutional: NAD, pleasant, cooperative     Head/Neck: Neck supple, No JVD         Respiratory/Thorax: CTAB, thorax symmetric     Cardiovascular: Regular, rate and rhythm,   Gastrointestinal: Nondistended, soft, non-tender  Skin: Warm and dry              Extremities:  +1 ankle edema, no discoloration, right 2nd toe  small ulcer, DP/PT signals on Doppler Right LE, DP signal on Left LE. Right groin dressing removed, no hematoma or bleeding.  Neuro: non-focal, awake/alert/oriented x3,   Psychological: Appropriate mood and behavior       Home Medications     Medication List      START taking these medications     clopidogrel 75 mg tablet; Commonly known as: Plavix; Take 1 tablet (75   mg) by mouth once daily.     CONTINUE taking these medications     albuterol 2.5 mg /3 mL (0.083 %) nebulizer solution   amLODIPine 10 mg tablet; Commonly known as: Norvasc   calcium acetate 667 mg capsule; Commonly known as: Phoslo   calcium carbonate 200 mg calcium chewable tablet; Commonly known as:   Tums   carvedilol 25 mg tablet; Commonly known as: Coreg   docusate sodium 100 mg capsule; Commonly known as: Colace   Eliquis 2.5 mg tablet; Generic drug: apixaban   Levemir FlexPen 100 unit/mL (3 mL) pen; Generic drug: insulin detemir     STOP taking these medications     aspirin 81 mg EC tablet       Outpatient Follow-Up  -Follow up appointment on 9/[facility] at [facility] at 15:30  -CV Ultrasound Vascular Monday Sep 23, 2024 2:30 PM      After all labs and VS were reviewed the decision was made that the patient was medically stable for discharge. Low blood sugar was addressed patient's glucose recovered.  The patient was discharged home in satisfactory condition.     I personally spent > 30 minutes with this patient, of which >50% of time was spent counseling and coordination of care.      Helen Pandya, MARLENE-CNP  Endovascular[facility]   Day: [id number]/Haiku/Night [facility]VI [id number]

## 2024-08-29 NOTE — NURSING NOTE
Report from Sending RN:    Report From: [place]  Recent Surgery of Procedure: No  Baseline Level of Consciousness (LOC): a/o x3  Oxygen Use: Yes  Type: nc 2L  Diabetic: Yes  Last BP Med Given Day of Dialysis: none  Last Pain Med Given: none  Lab Tests to be Obtained with Dialysis: Yes-cbc, rfp, hep b antigen, hep b antibody  Blood Transfusion to be Given During Dialysis: No  Available IV Access: Yes # 20 Rfa  Medications to be Administered During Dialysis: No  Continuous IV Infusion Running: Yes  Restraints on Currently or in the Last 24 Hours: No  Hand-Off Communication: pt had an uneventful night and is stable and can come to dialysis  Dialysis Catheter Dressing: [id number]  Last Dressing Change: n/a

## 2024-08-29 NOTE — CARE PLAN
Problem: Skin  Goal: Decreased wound size/increased tissue granulation at next dressing change  Outcome: Progressing  Goal: Participates in plan/prevention/treatment measures  Outcome: Progressing  Goal: Prevent/manage excess moisture  Outcome: Progressing  Goal: Prevent/minimize sheer/friction injuries  Outcome: Progressing  Goal: Promote/optimize nutrition  Outcome: Progressing  Goal: Promote skin healing  Outcome: Progressing     Problem: Diabetes  Goal: I will have no complications due to diabetes  Outcome: Progressing     Problem: Pain  Goal: Takes deep breaths with improved pain control throughout the shift  Outcome: Progressing  Goal: Turns in bed with improved pain control throughout the shift  Outcome: Progressing  Goal: Walks with improved pain control throughout the shift  Outcome: Progressing  Goal: Performs ADL's with improved pain control throughout shift  Outcome: Progressing  Goal: Participates in PT with improved pain control throughout the shift  Outcome: Progressing  Goal: Free from opioid side effects throughout the shift  Outcome: Progressing  Goal: Free from acute confusion related to pain meds throughout the shift  Outcome: Progressing     Problem: Respiratory  Goal: Clear secretions with interventions this shift  Outcome: Progressing  Goal: Minimize anxiety/maximize coping throughout shift  Outcome: Progressing  Goal: Minimal/no exertional discomfort or dyspnea this shift  Outcome: Progressing  Goal: No signs of respiratory distress (eg. Use of accessory muscles. Peds grunting)  Outcome: Progressing  Goal: Patent airway maintained this shift  Outcome: Progressing  Goal: Tolerate mechanical ventilation evidenced by VS/agitation level this shift  Outcome: Progressing  Goal: Tolerate pulmonary toileting this shift  Outcome: Progressing  Goal: Verbalize decreased shortness of breath this shift  Outcome: Progressing  Goal: Wean oxygen to maintain O2 saturation per order/standard this  shift  Outcome: Progressing  Goal: Increase self care and/or family involvement in next 24 hours  Outcome: Progressing   The patient's goals for the shift include      The clinical goals for the shift include Paitent will remain HDS overnight with no s/s of bleeding from cath site.

## 2024-08-29 NOTE — CONSULTS
"NEPHROLOGY NEW CONSULT NOTE   Vanessa Chowdhury   74 y.o.    @WT@  MRN/Room: [id number]    Reason for consult: Renal Failure on dialysis  Requesting physician: DR. Maria    HPI:  Vanessa Chowdhury is a 74 y.o. female   - With past medical Hx of HTN, HLD, Afib (on Eliquis), DM, ESRD on HD TTS (via left arm AVF), PAD who is admitted for endovascular intervention for right foot tissue loss.   Nephrology was consulted for ESRD management     In The ER: /54   Pulse 58   Temp 35.2 °C (95.4 °F) (Temporal)   Resp 16   Ht 1.626 m (5' 4.02\")   Wt 78.5 kg (173 lb)   SpO2 100%   BMI 29.68 kg/m²      No past medical history on file.   No past surgical history on file.   No family history on file.  Social History     Socioeconomic History    Marital status:      Spouse name: Not on file    Number of children: Not on file    Years of education: Not on file    Highest education level: Not on file   Occupational History    Not on file   Tobacco Use    Smoking status: Never    Smokeless tobacco: Never   Substance and Sexual Activity    Alcohol use: Never    Drug use: Never    Sexual activity: Not on file   Other Topics Concern    Not on file   Social History Narrative    Not on file     Social Determinants of Health     Financial Resource Strain: Low Risk  (4/13/2023)    Received from [facility]    Overall Financial Resource Strain (CARDIA)     Difficulty of Paying Living Expenses: Not hard at all   Food Insecurity: No Food Insecurity (4/14/2023)    Received from [facility]    Hunger Vital Sign     Worried About Running Out of Food in the Last Year: Never true     Ran Out of Food in the Last Year: Never true   Transportation Needs: No Transportation Needs (4/14/2023)    [facility]    PRAPARE - Transportation     Lack of Transportation (Medical): No     Lack of Transportation (Non-Medical): No   Physical Activity: Not " on file   Stress: Not on file   Social Connections: Not on file   Intimate Partner Violence: Not on file   Housing Stability: Unknown (4/13/2023)    [facility]    Housing Stability Vital Sign     Unable to Pay for Housing in the Last Year: No     Number of Places Lived in the Last Year: Not on file     Unstable Housing in the Last Year: No     Allergies   Allergen Reactions    Atorvastatin Myalgia    Clonidine Photosensitivity    Hydrochlorothiazide Photosensitivity    Zetia [Ezetimibe] Myalgia        Prior to Admission Medications   Prescriptions Last Dose Informant Patient Reported? Taking?   Eliquis 2.5 mg tablet Past Week Self, Other Yes Yes   Sig: Take 1 tablet (2.5 mg) by mouth 2 times a day.   Levemir FlexPen 100 unit/mL (3 mL) pen  Self, Other Yes No   Sig: Inject 10 Units under the skin once daily at bedtime.   albuterol 2.5 mg /3 mL (0.083 %) nebulizer solution Not Taking Self, Other Yes No   Sig: Inhale 3 mL (2.5 mg) 3 times a day.   amLODIPine (Norvasc) 10 mg tablet 8/27/2024 at 2100 Self, Other Yes Yes   Sig: Take 1 tablet (10 mg) by mouth once daily in the evening.   aspirin 81 mg EC tablet 8/27/2024 at 2100 Self, Other No Yes   Sig: Take 1 tablet (81 mg) by mouth once daily.   calcium acetate (Phoslo) 667 mg capsule  Self, Other Yes Yes   Sig: Take 1 capsule (667 mg) by mouth 3 times daily (morning, midday, late afternoon).   calcium carbonate (Tums) 200 mg calcium chewable tablet  Self, Other Yes Yes   Sig: Chew 2 tablets (1,000 mg) once daily in the evening.   carvedilol (Coreg) 25 mg tablet 8/27/2024 at 2100 Self, Other Yes Yes   Sig: Take 1 tablet (25 mg) by mouth 2 times a day.   docusate sodium (Colace) 100 mg capsule   Yes No   Sig: Take 1 capsule (100 mg) by mouth as needed at bedtime for constipation.      Facility-Administered Medications: None        Meds:   amLODIPine, 10 mg, Daily  apixaban, 2.5 mg, BID  carvedilol, 25 mg, Nightly  insulin detemir, 10  Units, Nightly  insulin lispro, 0-5 Units, TID  paricalcitol, 4 mcg, Once per day on Tuesday Thursday Saturday  sodium chloride, 200 mL, After Dialysis         acetaminophen, 650 mg, q6h PRN  albuterol, 2.5 mg, q6h PRN  dextrose, 12.5 g, q15 min PRN  dextrose, 25 g, q15 min PRN  glucagon, 1 mg, q15 min PRN  glucagon, 1 mg, q15 min PRN        Vitals:    08/29/24 0630   BP:    Pulse: 58   Resp:    Temp: 35.2 °C (95.4 °F)   SpO2:                Physical Examination:        Vitals:    08/29/24 0630   BP:    Pulse: 58   Resp:    Temp: 35.2 °C (95.4 °F)   SpO2:      General: The patient is awake, oriented, and is not in any distress.  Head and Neck: Normocephalic. No periorbital edema.  Eyes: non-icteric  Respiratory: Symmetric air entry. Symmetric chest expansion.No respiratory distress.  Cardiovascular: Symmetric peripheral pulses.  Skin: No maculopapular rash.  Abdomen: soft, nt/nd  Musculoskeletal: No peripheral edema in both left and right upper extremities.  No edema in either left or right lower extremities.  Neuro Exam: Speech is fluent. Moves extremities.    Blood Labs:  Results for orders placed or performed during the hospital encounter of 08/28/24 (from the past 96 hour(s))   ACTIVATED CLOTTING TIME LOW   Result Value Ref Range    POCT Activated Clotting Time Low Range 392 (H) 83 - 199 sec   ACTIVATED CLOTTING TIME LOW   Result Value Ref Range    POCT Activated Clotting Time Low Range 390 (H) 83 - 199 sec   ACTIVATED CLOTTING TIME LOW   Result Value Ref Range    POCT Activated Clotting Time Low Range 141 83 - 199 sec   Renal Function Panel   Result Value Ref Range    Glucose 231 (H) 74 - 99 mg/dL    Sodium 137 136 - 145 mmol/L    Potassium 3.6 3.5 - 5.3 mmol/L    Chloride 95 (L) 98 - 107 mmol/L    Bicarbonate 32 21 - 32 mmol/L    Anion Gap 14 10 - 20 mmol/L    Urea Nitrogen 42 (H) 6 - 23 mg/dL    Creatinine 6.05 (H) 0.50 - 1.05 mg/dL    eGFR 7 (L) >60 mL/min/1.73m*2    Calcium 8.8 8.6 - 10.6 mg/dL     Phosphorus 4.8 2.5 - 4.9 mg/dL    Albumin 3.1 (L) 3.4 - 5.0 g/dL   CBC   Result Value Ref Range    WBC 3.4 (L) 4.4 - 11.3 x10*3/uL    nRBC 0.0 0.0 - 0.0 /100 WBCs    RBC 3.38 (L) 4.00 - 5.20 x10*6/uL    Hemoglobin 10.6 (L) 12.0 - 16.0 g/dL    Hematocrit 31.4 (L) 36.0 - 46.0 %    MCV 93 80 - 100 fL    MCH 31.4 26.0 - 34.0 pg    MCHC 33.8 32.0 - 36.0 g/dL    RDW 13.2 11.5 - 14.5 %    Platelets 163 150 - 450 x10*3/uL   POCT GLUCOSE   Result Value Ref Range    POCT Glucose 216 (H) 74 - 99 mg/dL   POCT GLUCOSE   Result Value Ref Range    POCT Glucose 310 (H) 74 - 99 mg/dL   CBC   Result Value Ref Range    WBC 5.4 4.4 - 11.3 x10*3/uL    nRBC 0.0 0.0 - 0.0 /100 WBCs    RBC 3.17 (L) 4.00 - 5.20 x10*6/uL    Hemoglobin 9.7 (L) 12.0 - 16.0 g/dL    Hematocrit 28.4 (L) 36.0 - 46.0 %    MCV 90 80 - 100 fL    MCH 30.6 26.0 - 34.0 pg    MCHC 34.2 32.0 - 36.0 g/dL    RDW 13.2 11.5 - 14.5 %    Platelets 185 150 - 450 x10*3/uL   Renal Function Panel   Result Value Ref Range    Glucose 66 (L) 74 - 99 mg/dL    Sodium 135 (L) 136 - 145 mmol/L    Potassium 4.0 3.5 - 5.3 mmol/L    Chloride 95 (L) 98 - 107 mmol/L    Bicarbonate 27 21 - 32 mmol/L    Anion Gap 17 10 - 20 mmol/L    Urea Nitrogen 53 (H) 6 - 23 mg/dL    Creatinine 6.83 (H) 0.50 - 1.05 mg/dL    eGFR 6 (L) >60 mL/min/1.73m*2    Calcium 8.8 8.6 - 10.6 mg/dL    Phosphorus 4.8 2.5 - 4.9 mg/dL    Albumin 3.3 (L) 3.4 - 5.0 g/dL   Hepatitis B surface antigen   Result Value Ref Range    Hepatitis B Surface AG Nonreactive Nonreactive   Hepatitis B surface antibody   Result Value Ref Range    Hepatitis B Surface AB <3.1 <10.0 mIU/mL   POCT GLUCOSE   Result Value Ref Range    POCT Glucose 65 (L) 74 - 99 mg/dL   POCT GLUCOSE   Result Value Ref Range    POCT Glucose 155 (H) 74 - 99 mg/dL          ASSESSMENT AND PLAN:  I visited and examined the patient and this. She is doing fine and she is tolerating dialysis well. Her access is functioning fine without any problem. Hemodynamic is a stable.  We will continue her dialysis as per her regular schedule.       Tulio Gutiérrez MD  Senior Attending Physician  Director of Onco-Nephrology Program  Division of Nephrology & Hypertension  [facility]

## 2024-08-29 NOTE — PROGRESS NOTES
08/29/24 1459   Discharge Planning   Living Arrangements Family members  (grand son - Dani Rowland)   Support Systems Family members  (grand son - Dani Rowland)   Assistance Needed none   Type of Residence Private residence   Number of Stairs to Enter Residence 0   Number of Stairs Within Residence 0   Do you have animals or pets at home? No   Who is requesting discharge planning? Provider   Home or Post Acute Services None   Expected Discharge Disposition Home  (no needs anticipated)   Does the patient need discharge transport arranged? Yes   RoundTrip coordination needed? No   Has discharge transport been arranged? No   What day is the transport expected? 08/29/24   What time is the transport expected? 1500   Financial Resource Strain   How hard is it for you to pay for the very basics like food, housing, medical care, and heating? Not hard   Housing Stability   In the last 12 months, was there a time when you were not able to pay the mortgage or rent on time? N   In the past 12 months, how many times have you moved where you were living? 0   At any time in the past 12 months, were you homeless or living in a shelter (including now)? N   Transportation Needs   In the past 12 months, has lack of transportation kept you from medical appointments or from getting medications? no   In the past 12 months, has lack of transportation kept you from meetings, work, or from getting things needed for daily living? No       Pt admitted after referral from Dr. 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 Yesterday underwent Rt Lower Angiogram. Held overnight for observation and had HD this morning.    SW notified pt receives HD at [facility].    Met w/ pt to introduce myself, discuss role & discharge planning. Denies recent falls. Is independent in ADL's but her grand son does stay with her intermittently to help when she feels weak. She utilizes a motorized wheel chair, which  is at bedside. Feels safe at home. No SW needs at this time.     - 1400 called [id number] and confirmed pt and her insurance care coordinator arranged transportation at the [facility] entrance for 1430 - 1500     PCP: Dr. Roldan  Pharmacy: [facility] [place], [place]  DME: N/A    This TCC will continue to follow for home going needs and safe DC plan.     Trixie Cohen RN

## 2024-08-29 NOTE — PROGRESS NOTES
SW contacted [facility]  and notified pt's discharge today. Pt's HD documentation was sent to [facility] intake via email. JAYDEN will continue to follow and assist.     SIRISHA WillsonA, LSW

## 2024-08-29 NOTE — PROGRESS NOTES
"NEPHROLOGY NEW CONSULT NOTE   Vanessa Chowdhury   74 y.o.    @WT@  MRN/Room: [id number]    Reason for consult: Renal Failure on dialysis  Requesting physician: Dr. Maria    HPI:  Vanessa Chowdhury is a 74 y.o. female   - With past medical Hx of HTN, HLD, Afib (on Eliquis), DM, ESRD on HD TTS (via left arm AVF), PAD who is  admitted for endovascular intervention for right foot tissue loss.   Nephrology was consulted for ESRD management     In The ER: /54   Pulse 58   Temp 35.2 °C (95.4 °F) (Temporal)   Resp 16   Ht 1.626 m (5' 4.02\")   Wt 78.5 kg (173 lb)   SpO2 100%   BMI 29.68 kg/m²      No past medical history on file.   No past surgical history on file.   No family history on file.  Social History     Socioeconomic History    Marital status:      Spouse name: Not on file    Number of children: Not on file    Years of education: Not on file    Highest education level: Not on file   Occupational History    Not on file   Tobacco Use    Smoking status: Never    Smokeless tobacco: Never   Substance and Sexual Activity    Alcohol use: Never    Drug use: Never    Sexual activity: Not on file   Other Topics Concern    Not on file   Social History Narrative    Not on file     Social Determinants of Health     Financial Resource Strain: Low Risk  (4/13/2023)    Received from [facility]    Overall Financial Resource Strain (CARDIA)     Difficulty of Paying Living Expenses: Not hard at all   Food Insecurity: No Food Insecurity (4/14/2023)    Received from [facility]    Hunger Vital Sign     Worried About Running Out of Food in the Last Year: Never true     Ran Out of Food in the Last Year: Never true   Transportation Needs: No Transportation Needs (4/14/2023)    [facility]    PRAPARE - Transportation     Lack of Transportation (Medical): No     Lack of Transportation (Non-Medical): No   Physical Activity: " Not on file   Stress: Not on file   Social Connections: Not on file   Intimate Partner Violence: Not on file   Housing Stability: Unknown (4/13/2023)    [facility]    Housing Stability Vital Sign     Unable to Pay for Housing in the Last Year: No     Number of Places Lived in the Last Year: Not on file     Unstable Housing in the Last Year: No     Allergies   Allergen Reactions    Atorvastatin Myalgia    Clonidine Photosensitivity    Hydrochlorothiazide Photosensitivity    Zetia [Ezetimibe] Myalgia        Prior to Admission Medications   Prescriptions Last Dose Informant Patient Reported? Taking?   Eliquis 2.5 mg tablet Past Week Self, Other Yes Yes   Sig: Take 1 tablet (2.5 mg) by mouth 2 times a day.   Levemir FlexPen 100 unit/mL (3 mL) pen  Self, Other Yes No   Sig: Inject 10 Units under the skin once daily at bedtime.   albuterol 2.5 mg /3 mL (0.083 %) nebulizer solution Not Taking Self, Other Yes No   Sig: Inhale 3 mL (2.5 mg) 3 times a day.   amLODIPine (Norvasc) 10 mg tablet 8/27/2024 at 2100 Self, Other Yes Yes   Sig: Take 1 tablet (10 mg) by mouth once daily in the evening.   aspirin 81 mg EC tablet 8/27/2024 at 2100 Self, Other No Yes   Sig: Take 1 tablet (81 mg) by mouth once daily.   calcium acetate (Phoslo) 667 mg capsule  Self, Other Yes Yes   Sig: Take 1 capsule (667 mg) by mouth 3 times daily (morning, midday, late afternoon).   calcium carbonate (Tums) 200 mg calcium chewable tablet  Self, Other Yes Yes   Sig: Chew 2 tablets (1,000 mg) once daily in the evening.   carvedilol (Coreg) 25 mg tablet 8/27/2024 at 2100 Self, Other Yes Yes   Sig: Take 1 tablet (25 mg) by mouth 2 times a day.   docusate sodium (Colace) 100 mg capsule   Yes No   Sig: Take 1 capsule (100 mg) by mouth as needed at bedtime for constipation.      Facility-Administered Medications: None        Meds:   amLODIPine, 10 mg, Daily  apixaban, 2.5 mg, BID  carvedilol, 25 mg, Nightly  insulin  detemir, 10 Units, Nightly  insulin lispro, 0-5 Units, TID  paricalcitol, 4 mcg, Once per day on Tuesday Thursday Saturday  sodium chloride, 200 mL, After Dialysis         acetaminophen, 650 mg, q6h PRN  albuterol, 2.5 mg, q6h PRN  dextrose, 12.5 g, q15 min PRN  dextrose, 25 g, q15 min PRN  glucagon, 1 mg, q15 min PRN  glucagon, 1 mg, q15 min PRN        Vitals:    08/29/24 0630   BP:    Pulse: 58   Resp:    Temp: 35.2 °C (95.4 °F)   SpO2:                Physical Examination:        Vitals:    08/29/24 0630   BP:    Pulse: 58   Resp:    Temp: 35.2 °C (95.4 °F)   SpO2:      General: The patient is awake, oriented, and is not in any distress.  Head and Neck: Normocephalic. No periorbital edema.  Eyes: non-icteric  Respiratory: Symmetric air entry. Symmetric chest expansion.No respiratory distress.  Cardiovascular: Symmetric peripheral pulses.  Skin: No maculopapular rash.  Abdomen: soft, nt/nd  Musculoskeletal: No peripheral edema in both left and right upper extremities.  No edema in either left or right lower extremities.  Neuro Exam: Speech is fluent. Moves extremities.    Blood Labs:  Results for orders placed or performed during the hospital encounter of 08/28/24 (from the past 96 hour(s))   ACTIVATED CLOTTING TIME LOW   Result Value Ref Range    POCT Activated Clotting Time Low Range 392 (H) 83 - 199 sec   ACTIVATED CLOTTING TIME LOW   Result Value Ref Range    POCT Activated Clotting Time Low Range 390 (H) 83 - 199 sec   ACTIVATED CLOTTING TIME LOW   Result Value Ref Range    POCT Activated Clotting Time Low Range 141 83 - 199 sec   Renal Function Panel   Result Value Ref Range    Glucose 231 (H) 74 - 99 mg/dL    Sodium 137 136 - 145 mmol/L    Potassium 3.6 3.5 - 5.3 mmol/L    Chloride 95 (L) 98 - 107 mmol/L    Bicarbonate 32 21 - 32 mmol/L    Anion Gap 14 10 - 20 mmol/L    Urea Nitrogen 42 (H) 6 - 23 mg/dL    Creatinine 6.05 (H) 0.50 - 1.05 mg/dL    eGFR 7 (L) >60 mL/min/1.73m*2    Calcium 8.8 8.6 - 10.6 mg/dL     Phosphorus 4.8 2.5 - 4.9 mg/dL    Albumin 3.1 (L) 3.4 - 5.0 g/dL   CBC   Result Value Ref Range    WBC 3.4 (L) 4.4 - 11.3 x10*3/uL    nRBC 0.0 0.0 - 0.0 /100 WBCs    RBC 3.38 (L) 4.00 - 5.20 x10*6/uL    Hemoglobin 10.6 (L) 12.0 - 16.0 g/dL    Hematocrit 31.4 (L) 36.0 - 46.0 %    MCV 93 80 - 100 fL    MCH 31.4 26.0 - 34.0 pg    MCHC 33.8 32.0 - 36.0 g/dL    RDW 13.2 11.5 - 14.5 %    Platelets 163 150 - 450 x10*3/uL   POCT GLUCOSE   Result Value Ref Range    POCT Glucose 216 (H) 74 - 99 mg/dL   POCT GLUCOSE   Result Value Ref Range    POCT Glucose 310 (H) 74 - 99 mg/dL   CBC   Result Value Ref Range    WBC 5.4 4.4 - 11.3 x10*3/uL    nRBC 0.0 0.0 - 0.0 /100 WBCs    RBC 3.17 (L) 4.00 - 5.20 x10*6/uL    Hemoglobin 9.7 (L) 12.0 - 16.0 g/dL    Hematocrit 28.4 (L) 36.0 - 46.0 %    MCV 90 80 - 100 fL    MCH 30.6 26.0 - 34.0 pg    MCHC 34.2 32.0 - 36.0 g/dL    RDW 13.2 11.5 - 14.5 %    Platelets 185 150 - 450 x10*3/uL   Renal Function Panel   Result Value Ref Range    Glucose 66 (L) 74 - 99 mg/dL    Sodium 135 (L) 136 - 145 mmol/L    Potassium 4.0 3.5 - 5.3 mmol/L    Chloride 95 (L) 98 - 107 mmol/L    Bicarbonate 27 21 - 32 mmol/L    Anion Gap 17 10 - 20 mmol/L    Urea Nitrogen 53 (H) 6 - 23 mg/dL    Creatinine 6.83 (H) 0.50 - 1.05 mg/dL    eGFR 6 (L) >60 mL/min/1.73m*2    Calcium 8.8 8.6 - 10.6 mg/dL    Phosphorus 4.8 2.5 - 4.9 mg/dL    Albumin 3.3 (L) 3.4 - 5.0 g/dL   Hepatitis B surface antigen   Result Value Ref Range    Hepatitis B Surface AG Nonreactive Nonreactive   Hepatitis B surface antibody   Result Value Ref Range    Hepatitis B Surface AB <3.1 <10.0 mIU/mL   POCT GLUCOSE   Result Value Ref Range    POCT Glucose 65 (L) 74 - 99 mg/dL   POCT GLUCOSE   Result Value Ref Range    POCT Glucose 155 (H) 74 - 99 mg/dL          ASSESSMENT AND PLAN:  I visited and examined the patient and this.  She is doing fine and she is tolerating dialysis well.  Her access is functioning fine without any problem.  Hemodynamic is a  stable.  We will continue her dialysis as per her regular schedule.      Tulio Gutiérrez MD  Senior Attending Physician  Director of Onco-Nephrology Program  Division of Nephrology & Hypertension  [facility]  During urine.

## 2024-08-29 NOTE — DISCHARGE INSTRUCTIONS
MsGene Chowdhury,  You were admitted to [facility] for a planned procedure with intervention on your right leg. We opened up the blocked arteries in your leg for an adequate blood supply to your foot to help with healing process and alleviate the pain.   We are starting you on a medication called Plavix or Clopidogrel 75mg. Its very important for you to take this medication daily along with Eliquis to keep the arteries open and prevent occlusions.  We are stopping your Asprin.  No other changes were made to your medications.  We are scheduling the follow up appointment in 1 month with vascular testing VARGAS without Exercise prior to your appointment.  Please continue to follow up with your Podiatrist/Wound care center    If you have any questions please contact our office at [phone] opt. 1

## 2024-08-29 NOTE — NURSING NOTE
Report to Receiving RN:    Report To: 1123 am  Time Report Called: Stephie Betancourt LPN)  Hand-Off Communication: vs post 140/69; HR 65; no fluid removed; Pt ran even  Complications During Treatment: No  Ultrafiltration Treatment: No  Medications Administered During Dialysis: No  Blood Products Administered During Dialysis: No  Labs Sent During Dialysis: Yes, Hep B surface antigen/antibody, RFP, CBC  Heparin Drip Rate Changes: No  Dialysis Catheter Dressing: left AVF  Last Dressing Change: yes    Electronic Signatures:   (Signed David Barksdale RN)   Authored:    (Signed )   Authored:     Last Updated: 11:23 AM by DAVID BARKSDALE

## 2024-09-23 ENCOUNTER — APPOINTMENT (OUTPATIENT)
Dept: VASCULAR MEDICINE | Facility: HOSPITAL | Age: 74
End: 2024-09-23
Payer: COMMERCIAL

## 2024-09-26 ENCOUNTER — APPOINTMENT (OUTPATIENT)
Dept: CARDIOLOGY | Facility: HOSPITAL | Age: 74
End: 2024-09-26
Payer: COMMERCIAL

## 2024-10-07 ENCOUNTER — APPOINTMENT (OUTPATIENT)
Dept: VASCULAR MEDICINE | Facility: HOSPITAL | Age: 74
End: 2024-10-07
Payer: COMMERCIAL

## 2024-10-08 ENCOUNTER — APPOINTMENT (OUTPATIENT)
Dept: CARDIOLOGY | Facility: CLINIC | Age: 74
End: 2024-10-08
Payer: COMMERCIAL

## 2024-10-08 ENCOUNTER — APPOINTMENT (OUTPATIENT)
Dept: VASCULAR MEDICINE | Facility: HOSPITAL | Age: 74
End: 2024-10-08
Payer: COMMERCIAL

## 2024-10-10 ENCOUNTER — APPOINTMENT (OUTPATIENT)
Dept: CARDIOLOGY | Facility: HOSPITAL | Age: 74
End: 2024-10-10
Payer: COMMERCIAL

## (undated) DEVICE — GUIDEWIRE, COMMAND ST, HI-TORQUE, 0.18 X 300CM

## (undated) DEVICE — CATHETER, BALLOON, PACIFIC PLUS PTA, 3.0 X 120 X 150

## (undated) DEVICE — GUIDEWIRE, HI-TORQUE, VERSACORE, 260CM, FLOPPY

## (undated) DEVICE — Device

## (undated) DEVICE — CATHETER, CXI SUPPORT, .018 2.6F X 65CM, STR TIP

## (undated) DEVICE — SHEATH, PINNACLE, 10 CM,  5FR INTRODUCER, 5FR DIA, 2.5 CM DIALATOR

## (undated) DEVICE — WIRE, HT CONNECT, 250T, 018 X 300CM

## (undated) DEVICE — CATHETER, INPACT ADMIRAL, PACLITAXEL COATED , 6FR, 5MM X 250MM X 130CM

## (undated) DEVICE — SHEATH, INTRODUCER, ACT, 6.5FR 11CML, GREEN, 0.038 IN

## (undated) DEVICE — CATHETER, ARMADA 18PTA, 5.0MM X 200MM X 150CM

## (undated) DEVICE — ACCESS KIT, S-MAK MINI, 4FR 10CM 0.018IN 40CM, NT/PT, ECHO ENHANCE NEEDLE

## (undated) DEVICE — SURVIVAL KIT, EVEREST 30